# Patient Record
Sex: MALE | Race: WHITE | NOT HISPANIC OR LATINO | Employment: FULL TIME | URBAN - METROPOLITAN AREA
[De-identification: names, ages, dates, MRNs, and addresses within clinical notes are randomized per-mention and may not be internally consistent; named-entity substitution may affect disease eponyms.]

---

## 2020-08-12 ENCOUNTER — APPOINTMENT (EMERGENCY)
Dept: RADIOLOGY | Facility: HOSPITAL | Age: 35
End: 2020-08-12
Payer: COMMERCIAL

## 2020-08-12 ENCOUNTER — HOSPITAL ENCOUNTER (EMERGENCY)
Facility: HOSPITAL | Age: 35
Discharge: HOME | End: 2020-08-12
Attending: EMERGENCY MEDICINE | Admitting: EMERGENCY MEDICINE
Payer: COMMERCIAL

## 2020-08-12 VITALS
WEIGHT: 199.52 LBS | TEMPERATURE: 98 F | SYSTOLIC BLOOD PRESSURE: 141 MMHG | HEART RATE: 68 BPM | RESPIRATION RATE: 18 BRPM | OXYGEN SATURATION: 98 % | DIASTOLIC BLOOD PRESSURE: 84 MMHG

## 2020-08-12 DIAGNOSIS — M25.512 LEFT SHOULDER PAIN: ICD-10-CM

## 2020-08-12 DIAGNOSIS — S49.92XA INJURY OF LEFT SHOULDER: Primary | ICD-10-CM

## 2020-08-12 PROCEDURE — 73030 X-RAY EXAM OF SHOULDER: CPT

## 2020-08-12 PROCEDURE — 99283 EMERGENCY DEPT VISIT LOW MDM: CPT

## 2020-08-12 PROCEDURE — 73010 X-RAY EXAM OF SHOULDER BLADE: CPT

## 2020-08-12 PROCEDURE — 99284 EMERGENCY DEPT VISIT MOD MDM: CPT | Performed by: PHYSICIAN ASSISTANT

## 2020-08-12 RX ORDER — LIDOCAINE HYDROCHLORIDE 20 MG/ML
JELLY TOPICAL 3 TIMES DAILY
Qty: 30 ML | Refills: 0 | Status: SHIPPED | OUTPATIENT
Start: 2020-08-12 | End: 2022-01-10 | Stop reason: ALTCHOICE
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
## 2020-08-18 ENCOUNTER — OFFICE VISIT (OUTPATIENT)
Dept: OBGYN CLINIC | Facility: CLINIC | Age: 35
End: 2020-08-18
Payer: COMMERCIAL

## 2020-08-18 VITALS
SYSTOLIC BLOOD PRESSURE: 112 MMHG | HEART RATE: 65 BPM | WEIGHT: 202 LBS | BODY MASS INDEX: 31.71 KG/M2 | TEMPERATURE: 98 F | DIASTOLIC BLOOD PRESSURE: 69 MMHG | HEIGHT: 67 IN

## 2020-08-18 DIAGNOSIS — Z13.89 ENCOUNTER FOR IMAGING TO SCREEN FOR METAL PRIOR TO MRI: ICD-10-CM

## 2020-08-18 DIAGNOSIS — S49.92XA INJURY OF GLENOID LABRUM, LEFT, INITIAL ENCOUNTER: Primary | ICD-10-CM

## 2020-08-18 PROCEDURE — 99203 OFFICE O/P NEW LOW 30 MIN: CPT | Performed by: FAMILY MEDICINE
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
## 2020-08-21 ENCOUNTER — HOSPITAL ENCOUNTER (OUTPATIENT)
Dept: RADIOLOGY | Facility: HOSPITAL | Age: 35
Discharge: HOME | End: 2020-08-21
Attending: FAMILY MEDICINE
Payer: COMMERCIAL

## 2020-08-21 ENCOUNTER — HOSPITAL ENCOUNTER (OUTPATIENT)
Dept: RADIOLOGY | Facility: HOSPITAL | Age: 35
Discharge: HOME | End: 2020-08-21
Attending: FAMILY MEDICINE | Admitting: RADIOLOGY
Payer: COMMERCIAL

## 2020-08-21 ENCOUNTER — TRANSCRIBE ORDERS (OUTPATIENT)
Dept: RADIOLOGY | Facility: HOSPITAL | Age: 35
End: 2020-08-21

## 2020-08-21 DIAGNOSIS — S49.92XA INJURY OF GLENOID LABRUM, LEFT, INITIAL ENCOUNTER: ICD-10-CM

## 2020-08-21 PROCEDURE — G1004 CDSM NDSC: HCPCS

## 2020-08-21 PROCEDURE — 73222 MRI JOINT UPR EXTREM W/DYE: CPT

## 2020-08-21 PROCEDURE — 77002 NEEDLE LOCALIZATION BY XRAY: CPT

## 2020-08-21 PROCEDURE — A9585 GADOBUTROL INJECTION: HCPCS | Performed by: FAMILY MEDICINE

## 2020-08-21 PROCEDURE — 23350 INJECTION FOR SHOULDER X-RAY: CPT

## 2020-08-21 RX ORDER — SODIUM CHLORIDE 9 MG/ML
50 INJECTION INTRAVENOUS
Status: COMPLETED | OUTPATIENT
Start: 2020-08-21 | End: 2020-08-21

## 2020-08-21 RX ORDER — LIDOCAINE HYDROCHLORIDE 10 MG/ML
5 INJECTION, SOLUTION EPIDURAL; INFILTRATION; INTRACAUDAL; PERINEURAL
Status: COMPLETED | OUTPATIENT
Start: 2020-08-21 | End: 2020-08-21

## 2020-08-21 RX ADMIN — LIDOCAINE HYDROCHLORIDE 5 ML: 10 INJECTION, SOLUTION EPIDURAL; INFILTRATION; INTRACAUDAL; PERINEURAL at 13:10

## 2020-08-21 RX ADMIN — IOHEXOL 3 ML: 300 INJECTION, SOLUTION INTRAVENOUS at 13:10

## 2020-08-21 RX ADMIN — SODIUM CHLORIDE 12 ML: 9 INJECTION, SOLUTION INTRAMUSCULAR; INTRAVENOUS; SUBCUTANEOUS at 13:10

## 2020-08-21 RX ADMIN — GADOBUTROL 1 ML: 604.72 INJECTION INTRAVENOUS at 13:10

## 2020-08-26 ENCOUNTER — OFFICE VISIT (OUTPATIENT)
Dept: OBGYN CLINIC | Facility: CLINIC | Age: 35
End: 2020-08-26
Payer: COMMERCIAL

## 2020-08-26 VITALS
TEMPERATURE: 98.9 F | HEIGHT: 67 IN | SYSTOLIC BLOOD PRESSURE: 120 MMHG | BODY MASS INDEX: 31.71 KG/M2 | DIASTOLIC BLOOD PRESSURE: 77 MMHG | HEART RATE: 76 BPM | WEIGHT: 202 LBS

## 2020-08-26 DIAGNOSIS — S46.012A ROTATOR CUFF STRAIN, LEFT, INITIAL ENCOUNTER: ICD-10-CM

## 2020-08-26 DIAGNOSIS — M19.012 ARTHRITIS OF LEFT ACROMIOCLAVICULAR JOINT: ICD-10-CM

## 2020-08-26 DIAGNOSIS — M62.838 TRAPEZIUS MUSCLE SPASM: ICD-10-CM

## 2020-08-26 DIAGNOSIS — M75.42 SUBACROMIAL IMPINGEMENT OF LEFT SHOULDER: Primary | ICD-10-CM

## 2020-08-26 PROCEDURE — 99213 OFFICE O/P EST LOW 20 MIN: CPT | Performed by: FAMILY MEDICINE

## 2020-08-26 RX ORDER — DICLOFENAC SODIUM 75 MG/1
75 TABLET, DELAYED RELEASE ORAL 2 TIMES DAILY
Qty: 60 TABLET | Refills: 1 | Status: SHIPPED | OUTPATIENT
Start: 2020-08-26 | End: 2022-01-10 | Stop reason: ALTCHOICE
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
## 2020-10-12 ENCOUNTER — OFFICE VISIT (OUTPATIENT)
Dept: OBGYN CLINIC | Facility: CLINIC | Age: 35
End: 2020-10-12
Payer: COMMERCIAL

## 2020-10-12 VITALS
SYSTOLIC BLOOD PRESSURE: 111 MMHG | WEIGHT: 202 LBS | BODY MASS INDEX: 31.71 KG/M2 | TEMPERATURE: 98.5 F | HEART RATE: 67 BPM | DIASTOLIC BLOOD PRESSURE: 72 MMHG | HEIGHT: 67 IN

## 2020-10-12 DIAGNOSIS — M75.22 BICEPS TENDINITIS OF LEFT SHOULDER: Primary | ICD-10-CM

## 2020-10-12 PROCEDURE — 20610 DRAIN/INJ JOINT/BURSA W/O US: CPT | Performed by: FAMILY MEDICINE

## 2020-10-12 PROCEDURE — 99213 OFFICE O/P EST LOW 20 MIN: CPT | Performed by: FAMILY MEDICINE

## 2020-10-12 RX ORDER — TRIAMCINOLONE ACETONIDE 40 MG/ML
20 INJECTION, SUSPENSION INTRA-ARTICULAR; INTRAMUSCULAR
Status: COMPLETED | OUTPATIENT
Start: 2020-10-12 | End: 2020-10-12

## 2020-10-12 RX ORDER — LIDOCAINE HYDROCHLORIDE 10 MG/ML
3 INJECTION, SOLUTION INFILTRATION; PERINEURAL
Status: COMPLETED | OUTPATIENT
Start: 2020-10-12 | End: 2020-10-12

## 2020-10-12 RX ADMIN — TRIAMCINOLONE ACETONIDE 20 MG: 40 INJECTION, SUSPENSION INTRA-ARTICULAR; INTRAMUSCULAR at 16:35

## 2020-10-12 RX ADMIN — LIDOCAINE HYDROCHLORIDE 3 ML: 10 INJECTION, SOLUTION INFILTRATION; PERINEURAL at 16:35

## 2020-11-13 ENCOUNTER — NURSE TRIAGE (OUTPATIENT)
Dept: OTHER | Facility: OTHER | Age: 35
End: 2020-11-13

## 2020-11-13 DIAGNOSIS — Z11.59 SPECIAL SCREENING EXAMINATION FOR UNSPECIFIED VIRAL DISEASE: Primary | ICD-10-CM

## 2020-11-13 DIAGNOSIS — Z11.59 SPECIAL SCREENING EXAMINATION FOR UNSPECIFIED VIRAL DISEASE: ICD-10-CM

## 2020-11-13 PROCEDURE — U0003 INFECTIOUS AGENT DETECTION BY NUCLEIC ACID (DNA OR RNA); SEVERE ACUTE RESPIRATORY SYNDROME CORONAVIRUS 2 (SARS-COV-2) (CORONAVIRUS DISEASE [COVID-19]), AMPLIFIED PROBE TECHNIQUE, MAKING USE OF HIGH THROUGHPUT TECHNOLOGIES AS DESCRIBED BY CMS-2020-01-R: HCPCS | Performed by: FAMILY MEDICINE

## 2020-11-15 LAB — SARS-COV-2 RNA SPEC QL NAA+PROBE: NOT DETECTED

## 2020-11-23 ENCOUNTER — OFFICE VISIT (OUTPATIENT)
Dept: OBGYN CLINIC | Facility: CLINIC | Age: 35
End: 2020-11-23
Payer: COMMERCIAL

## 2020-11-23 VITALS
BODY MASS INDEX: 31.71 KG/M2 | SYSTOLIC BLOOD PRESSURE: 124 MMHG | HEIGHT: 67 IN | WEIGHT: 202 LBS | HEART RATE: 66 BPM | DIASTOLIC BLOOD PRESSURE: 82 MMHG

## 2020-11-23 DIAGNOSIS — M75.22 BICEPS TENDINITIS OF LEFT SHOULDER: Primary | ICD-10-CM

## 2020-11-23 DIAGNOSIS — M19.012 ARTHRITIS OF LEFT ACROMIOCLAVICULAR JOINT: ICD-10-CM

## 2020-11-23 PROCEDURE — 99213 OFFICE O/P EST LOW 20 MIN: CPT | Performed by: FAMILY MEDICINE

## 2021-03-30 DIAGNOSIS — Z23 ENCOUNTER FOR IMMUNIZATION: ICD-10-CM

## 2021-11-22 ENCOUNTER — TELEPHONE (OUTPATIENT)
Dept: FAMILY MEDICINE CLINIC | Facility: CLINIC | Age: 36
End: 2021-11-22

## 2022-01-10 ENCOUNTER — OFFICE VISIT (OUTPATIENT)
Dept: FAMILY MEDICINE CLINIC | Facility: CLINIC | Age: 37
End: 2022-01-10
Payer: COMMERCIAL

## 2022-01-10 VITALS
DIASTOLIC BLOOD PRESSURE: 78 MMHG | BODY MASS INDEX: 28.19 KG/M2 | HEIGHT: 68 IN | OXYGEN SATURATION: 97 % | HEART RATE: 89 BPM | TEMPERATURE: 98.3 F | SYSTOLIC BLOOD PRESSURE: 112 MMHG | WEIGHT: 186 LBS

## 2022-01-10 DIAGNOSIS — Z13.220 SCREENING FOR LIPID DISORDERS: ICD-10-CM

## 2022-01-10 DIAGNOSIS — A60.00 RECURRENT GENITAL HERPES: ICD-10-CM

## 2022-01-10 DIAGNOSIS — F41.9 ANXIETY: Primary | ICD-10-CM

## 2022-01-10 DIAGNOSIS — Z13.1 SCREENING FOR DIABETES MELLITUS: ICD-10-CM

## 2022-01-10 PROCEDURE — 99204 OFFICE O/P NEW MOD 45 MIN: CPT | Performed by: FAMILY MEDICINE

## 2022-01-10 PROCEDURE — 3008F BODY MASS INDEX DOCD: CPT | Performed by: FAMILY MEDICINE

## 2022-01-10 PROCEDURE — 3725F SCREEN DEPRESSION PERFORMED: CPT | Performed by: FAMILY MEDICINE

## 2022-01-10 RX ORDER — VALACYCLOVIR HYDROCHLORIDE 500 MG/1
500 TABLET, FILM COATED ORAL 2 TIMES DAILY
Qty: 6 TABLET | Refills: 3 | Status: SHIPPED | OUTPATIENT
Start: 2022-01-10 | End: 2022-01-13

## 2022-01-10 RX ORDER — ALPRAZOLAM 0.25 MG/1
0.25 TABLET ORAL DAILY PRN
Qty: 30 TABLET | Refills: 0 | Status: SHIPPED | OUTPATIENT
Start: 2022-01-10

## 2022-01-10 NOTE — PROGRESS NOTES
BMI Counseling: Body mass index is 28 28 kg/m²  The BMI is above normal  Nutrition recommendations include reducing portion sizes and 3-5 servings of fruits/vegetables daily  Exercise recommendations include exercising 3-5 times per week

## 2022-01-10 NOTE — PROGRESS NOTES
Assessment/Plan:    No problem-specific Assessment & Plan notes found for this encounter  Diagnoses and all orders for this visit:    Anxiety  After discussing risks and benefits of medication along with side effects will start the following:  -     ALPRAZolam (XANAX) 0 25 mg tablet; Take 1 tablet (0 25 mg total) by mouth daily as needed for anxiety  PDMP checked and appropriate  Controlled substance agreement signed today    Recurrent genital herpes  -     valACYclovir (VALTREX) 500 mg tablet; Take 1 tablet (500 mg total) by mouth 2 (two) times a day for 3 days    Screening for diabetes mellitus  -     Comprehensive metabolic panel; Future    Screening for lipid disorders  -     Lipid panel; Future      Follow up in 6 months or as needed    Subjective:      Patient ID: Jose Rodriguez is a 39 y o  male  Patient is here to establish care  Has a Hx of anxiety and panic attacks takes xanax only seldom as needed  Also has a Hx of genitals recurrent herpes  Takes Valtrex when lesions occur  The following portions of the patient's history were reviewed and updated as appropriate:   He  has no past medical history on file  He   Patient Active Problem List    Diagnosis Date Noted    Anxiety 01/10/2022    Recurrent genital herpes 01/10/2022     He  has a past surgical history that includes Hernia repair; ARTHROSCOPY KNEE; and FL injection left shoulder (arthrogram) (8/21/2020)  His family history is not on file  He  reports that he has never smoked  He has never used smokeless tobacco  He reports previous alcohol use  He reports previous drug use    Current Outpatient Medications   Medication Sig Dispense Refill    ALPRAZolam (XANAX) 0 25 mg tablet Take 1 tablet (0 25 mg total) by mouth daily as needed for anxiety 30 tablet 0    valACYclovir (VALTREX) 500 mg tablet Take 1 tablet (500 mg total) by mouth 2 (two) times a day for 3 days 6 tablet 3     No current facility-administered medications for this visit  Current Outpatient Medications on File Prior to Visit   Medication Sig    [DISCONTINUED] diclofenac (VOLTAREN) 75 mg EC tablet Take 1 tablet (75 mg total) by mouth 2 (two) times a day (Patient not taking: Reported on 11/23/2020)    [DISCONTINUED] diclofenac sodium (VOLTAREN) 50 mg EC tablet Take 1 tablet (50 mg total) by mouth 2 (two) times a day (Patient not taking: Reported on 11/23/2020)    [DISCONTINUED] lidocaine (XYLOCAINE) 2 % topical gel Apply topically 3 (three) times a day P r n  Pain to the affected area (Patient not taking: Reported on 11/23/2020)     No current facility-administered medications on file prior to visit  He has No Known Allergies       Review of Systems   Constitutional: Negative for activity change, appetite change, fatigue and fever  HENT: Negative for congestion and ear discharge  Respiratory: Negative for cough and shortness of breath  Cardiovascular: Negative for chest pain and palpitations  Gastrointestinal: Negative for diarrhea and nausea  Musculoskeletal: Negative for arthralgias and back pain  Skin: Negative for color change and rash  Neurological: Negative for dizziness and headaches  Psychiatric/Behavioral: Negative for agitation and behavioral problems  Objective:      /78 (BP Location: Left arm, Patient Position: Sitting, Cuff Size: Standard)   Pulse 89   Temp 98 3 °F (36 8 °C)   Ht 5' 8" (1 727 m)   Wt 84 4 kg (186 lb)   SpO2 97%   BMI 28 28 kg/m²          Physical Exam  Constitutional:       General: He is not in acute distress  Appearance: He is well-developed  He is not diaphoretic  Eyes:      General: No scleral icterus  Pupils: Pupils are equal, round, and reactive to light  Cardiovascular:      Rate and Rhythm: Normal rate and regular rhythm  Heart sounds: Normal heart sounds  No murmur heard  Pulmonary:      Effort: Pulmonary effort is normal  No respiratory distress        Breath sounds: Normal breath sounds  No wheezing  Abdominal:      General: Bowel sounds are normal  There is no distension  Palpations: Abdomen is soft  Tenderness: There is no abdominal tenderness  Skin:     General: Skin is warm and dry  Findings: No rash  Neurological:      Mental Status: He is alert and oriented to person, place, and time

## 2022-03-23 ENCOUNTER — OFFICE VISIT (OUTPATIENT)
Dept: URGENT CARE | Facility: CLINIC | Age: 37
End: 2022-03-23
Payer: COMMERCIAL

## 2022-03-23 VITALS
BODY MASS INDEX: 26.52 KG/M2 | DIASTOLIC BLOOD PRESSURE: 69 MMHG | HEIGHT: 68 IN | TEMPERATURE: 98.2 F | OXYGEN SATURATION: 97 % | WEIGHT: 175 LBS | SYSTOLIC BLOOD PRESSURE: 125 MMHG | HEART RATE: 74 BPM | RESPIRATION RATE: 18 BRPM

## 2022-03-23 DIAGNOSIS — L21.9 SEBORRHEIC DERMATITIS: Primary | ICD-10-CM

## 2022-03-23 PROCEDURE — 99213 OFFICE O/P EST LOW 20 MIN: CPT | Performed by: PHYSICIAN ASSISTANT

## 2022-03-23 NOTE — PROGRESS NOTES
[facility]        NAME: Renata Saavedra is a 39 y o  male  : 1985    MRN: [id number]  DATE: 2022  TIME: 12:41 PM    Assessment and Plan   Seborrheic dermatitis [L21 9]  1  Seborrheic dermatitis  selenium sulfide (SELSUN) 1 %         Patient Instructions     Patient Instructions   Start selenium shampoo  Use shampoo until symptoms resolve  Refer to derm  Do not scratch  Follow up with Primary Care Physician  Return to clinic with new or worsening symptoms as discussed  Seborrheic Dermatitis   AMBULATORY CARE:   Seborrheic dermatitis  is a skin condition that causes a rash and flaking, scaling skin  The condition usually affects hairy areas of the body, such as the scalp  Your face, ears, chest, groin, or back may be affected  Seborrheic dermatitis can happen at any age  The condition often goes away on its own in infants, but it may return during adolescence  Seborrheic dermatitis may be caused by a fungal infection, immune system problems, or hormone changes  Common signs and symptoms: You may have symptoms during the winter but not during the summer  Stress or a lack of sleep can make your symptoms worse  You may have any of the following:  · Skin flakes, or red, itching, or stinging skin    · Scaly patches (scales) of skin that are also greasy    · White or yellow crust on the skin or eyelids    · Scaling on the scalp commonly known as dandruff    Call your doctor or dermatologist if:   · You have new or worsening symptoms  · Your symptoms make it difficult for you to do your daily activities  · Your symptoms do not improve even after treatment  · You have questions or concerns about your condition or care  Treatment  may not be needed  The following are commonly used when seborrheic dermatitis needs to be treated:  · Medicines  may be given to treat a fungal or bacterial infection  You may also need a steroid medicine   You may be given these medicines in pill form or in a cream to apply to your skin  · Dandruff shampoo  may help control symptoms  The shampoo may be used on your scalp and hair, and also on your skin  Your healthcare provider may recommend that you start with a mild dandruff shampoo  You may need to use a stronger shampoo or alternate shampoos if your symptoms do not improve  Ask which kind is right for your hair  Some shampoos used to manage seborrheic dermatitis contain coal tar or other ingredients that may discolor light hair  · Light therapy  may be used if other treatments do not work  You will receive a medicine to make your skin more sensitive to light  Then your skin will be put under an ultraviolet light  The light helps control skin growth  Manage seborrheic dermatitis:   · Wash your skin and hair often  Your healthcare provider can tell you how often to wash  You may need to wash your hair every day or two, or once per week, depending on the kind of hair you have  Apply a gentle moisturizer to your skin after you wash  Use mild soaps and moisturizers  Do not use any product that contains alcohol  Alcohol can dry your skin and make your symptoms worse  · Protect your scalp if you use coal tar shampoo  Coal tar shampoo can make your skin more sensitive to light  Wear a hat when you are outside  Do not use tanning beds or sun lamps  · Remove scales after you soften them  Do not pull on the scales  This can spread infection and may cause hair loss  Apply mineral oil or olive oil to the skin and let it sit for 1 hour  Then use a soft-bristled brush to remove the scales or shampoo your hair  · Clean your eyelids, if needed  Use baby shampoo to wash your eyelids every night  Use a cotton swab to remove scales  A warm compress may also help control symptoms  To make a warm compress, soak a soft washcloth in warm water  Wring out the extra water and apply the cloth to your eyelid for a few minutes      · Consider shaving off your beard or mustache  Your symptoms may be worse under your beard or mustache  Shaving may help reduce your symptoms and prevent them from returning in this area  Follow up with your doctor or dermatologist as directed:  Write down your questions so you remember to ask them during your visits  © Copyright [employer] 2022 Information is for End User's use only and may not be sold, redistributed or otherwise used for commercial purposes  All illustrations and images included in CareNotes® are the copyrighted property of A D A M [employer] Inc  or [employer]   The above information is an  only  It is not intended as medical advice for individual conditions or treatments  Talk to your doctor, nurse or pharmacist before following any medical regimen to see if it is safe and effective for you  **Portions of the record may have been created with voice recognition software  Occasional wrong word or "sound a like" substitutions may have occurred due to the inherent limitations of voice recognition software  Read the chart carefully and recognize, using context, where substitutions have occurred  **     Chief Complaint     Chief Complaint   Patient presents with    Rash     Pt c/o rash on scalp x 8-9 months  History of Present Illness     46yo male presents to clinic with complaints of scalp rash x 9 months  States the rash occurred shortly after finishing a course of antibiotics for acne  reprots red bumps and itching that will flare up in different spots on his scalp  Denies pain, fever  He has been using defferen gel and tea tree oil which seems to help  Review of Systems     Review of Systems   Constitutional: Negative for chills and fever  HENT: Negative for congestion and trouble swallowing  Respiratory: Negative for cough and shortness of breath  Musculoskeletal: Negative for arthralgias and myalgias  Skin: Positive for rash  Neurological: Negative for headaches  Current Medications     Current Outpatient Medications:     ALPRAZolam (XANAX) 0 25 mg tablet, Take 1 tablet (0 25 mg total) by mouth daily as needed for anxiety, Disp: 30 tablet, Rfl: 0    [START ON 3/24/2022] selenium sulfide (SELSUN) 1 %, Apply topically 2 (two) times a week, Disp: 118 mL, Rfl: 0    valACYclovir (VALTREX) 500 mg tablet, Take 1 tablet (500 mg total) by mouth 2 (two) times a day for 3 days, Disp: 6 tablet, Rfl: 3    Current Allergies     Allergies as of 03/23/2022    (No Known Allergies)            The following portions of the patient's history were reviewed and updated as appropriate: allergies, current medications, past family history, past medical history, past social history, past surgical history and problem list      Past Medical History:   Diagnosis Date    Tachycardia        Past Surgical History:   Procedure Laterality Date    ARTHROSCOPY KNEE      FL INJECTION LEFT SHOULDER (ARTHROGRAM)  8/21/2020    HERNIA REPAIR         No family history on file  Medications have been verified  Objective     /69   Pulse 74   Temp 98 2 °F (36 8 °C)   Resp 18   Ht 5' 8" (1 727 m)   Wt 79 4 kg (175 lb)   SpO2 97%   BMI 26 61 kg/m²        Physical Exam     Physical Exam  Vitals and nursing note reviewed  Constitutional:       General: He is not in acute distress  Appearance: Normal appearance  HENT:      Head: Normocephalic and atraumatic  Cardiovascular:      Rate and Rhythm: Normal rate and regular rhythm  Pulses: Normal pulses  Pulmonary:      Effort: Pulmonary effort is normal       Breath sounds: Normal breath sounds  Skin:     Capillary Refill: Capillary refill takes less than 2 seconds  Findings: Rash (grouped, erythematous macuolopapular rash in various spots of scalp  no edema, drainage, heat or ttp  scaling present  ) present  Neurological:      Mental Status: He is alert  Sensory: No sensory deficit

## 2022-03-23 NOTE — PATIENT INSTRUCTIONS
Start selenium shampoo  Use shampoo until symptoms resolve  Refer to derm  Do not scratch  Follow up with Primary Care Physician  Return to clinic with new or worsening symptoms as discussed  Seborrheic Dermatitis   AMBULATORY CARE:   Seborrheic dermatitis  is a skin condition that causes a rash and flaking, scaling skin  The condition usually affects hairy areas of the body, such as the scalp  Your face, ears, chest, groin, or back may be affected  Seborrheic dermatitis can happen at any age  The condition often goes away on its own in infants, but it may return during adolescence  Seborrheic dermatitis may be caused by a fungal infection, immune system problems, or hormone changes  Common signs and symptoms: You may have symptoms during the winter but not during the summer  Stress or a lack of sleep can make your symptoms worse  You may have any of the following:  · Skin flakes, or red, itching, or stinging skin    · Scaly patches (scales) of skin that are also greasy    · White or yellow crust on the skin or eyelids    · Scaling on the scalp commonly known as dandruff    Call your doctor or dermatologist if:   · You have new or worsening symptoms  · Your symptoms make it difficult for you to do your daily activities  · Your symptoms do not improve even after treatment  · You have questions or concerns about your condition or care  Treatment  may not be needed  The following are commonly used when seborrheic dermatitis needs to be treated:  · Medicines  may be given to treat a fungal or bacterial infection  You may also need a steroid medicine  You may be given these medicines in pill form or in a cream to apply to your skin  · Dandruff shampoo  may help control symptoms  The shampoo may be used on your scalp and hair, and also on your skin  Your healthcare provider may recommend that you start with a mild dandruff shampoo   You may need to use a stronger shampoo or alternate shampoos if your symptoms do not improve  Ask which kind is right for your hair  Some shampoos used to manage seborrheic dermatitis contain coal tar or other ingredients that may discolor light hair  · Light therapy  may be used if other treatments do not work  You will receive a medicine to make your skin more sensitive to light  Then your skin will be put under an ultraviolet light  The light helps control skin growth  Manage seborrheic dermatitis:   · Wash your skin and hair often  Your healthcare provider can tell you how often to wash  You may need to wash your hair every day or two, or once per week, depending on the kind of hair you have  Apply a gentle moisturizer to your skin after you wash  Use mild soaps and moisturizers  Do not use any product that contains alcohol  Alcohol can dry your skin and make your symptoms worse  · Protect your scalp if you use coal tar shampoo  Coal tar shampoo can make your skin more sensitive to light  Wear a hat when you are outside  Do not use tanning beds or sun lamps  · Remove scales after you soften them  Do not pull on the scales  This can spread infection and may cause hair loss  Apply mineral oil or olive oil to the skin and let it sit for 1 hour  Then use a soft-bristled brush to remove the scales or shampoo your hair  · Clean your eyelids, if needed  Use baby shampoo to wash your eyelids every night  Use a cotton swab to remove scales  A warm compress may also help control symptoms  To make a warm compress, soak a soft washcloth in warm water  Wring out the extra water and apply the cloth to your eyelid for a few minutes  · Consider shaving off your beard or mustache  Your symptoms may be worse under your beard or mustache  Shaving may help reduce your symptoms and prevent them from returning in this area  Follow up with your doctor or dermatologist as directed:  Write down your questions so you remember to ask them during your visits    © Copyright [employer] 2022 Information is for [employer] use only and may not be sold, redistributed or otherwise used for commercial purposes  All illustrations and images included in CareNotes® are the copyrighted property of A [employer] A M , Inc  or [employer]  The above information is an  only  It is not intended as medical advice for individual conditions or treatments  Talk to your doctor, nurse or pharmacist before following any medical regimen to see if it is safe and effective for you

## 2023-02-13 ENCOUNTER — TELEPHONE (OUTPATIENT)
Dept: FAMILY MEDICINE CLINIC | Facility: CLINIC | Age: 38
End: 2023-02-13

## 2023-02-13 DIAGNOSIS — Z13.220 SCREENING, LIPID: ICD-10-CM

## 2023-02-13 DIAGNOSIS — Z13.1 SCREENING FOR DIABETES MELLITUS (DM): Primary | ICD-10-CM

## 2023-02-13 NOTE — TELEPHONE ENCOUNTER
Pt stopped in to have labs re-instated    Pt works out of town and requests updated lab scripts for : lipid panel and cmp

## 2023-12-19 ENCOUNTER — APPOINTMENT (OUTPATIENT)
Dept: LAB | Facility: CLINIC | Age: 38
End: 2023-12-19
Payer: COMMERCIAL

## 2023-12-19 DIAGNOSIS — Z13.1 SCREENING FOR DIABETES MELLITUS (DM): ICD-10-CM

## 2023-12-19 DIAGNOSIS — Z13.220 SCREENING, LIPID: ICD-10-CM

## 2023-12-19 LAB
ALBUMIN SERPL BCP-MCNC: 4.6 G/DL (ref 3.5–5)
ALP SERPL-CCNC: 47 U/L (ref 34–104)
ALT SERPL W P-5'-P-CCNC: 10 U/L (ref 7–52)
ANION GAP SERPL CALCULATED.3IONS-SCNC: 6 MMOL/L
AST SERPL W P-5'-P-CCNC: 17 U/L (ref 13–39)
BILIRUB SERPL-MCNC: 0.92 MG/DL (ref 0.2–1)
BUN SERPL-MCNC: 11 MG/DL (ref 5–25)
CALCIUM SERPL-MCNC: 9.2 MG/DL (ref 8.4–10.2)
CHLORIDE SERPL-SCNC: 105 MMOL/L (ref 96–108)
CHOLEST SERPL-MCNC: 134 MG/DL
CO2 SERPL-SCNC: 29 MMOL/L (ref 21–32)
CREAT SERPL-MCNC: 0.92 MG/DL (ref 0.6–1.3)
GFR SERPL CREATININE-BSD FRML MDRD: 105 ML/MIN/1.73SQ M
GLUCOSE P FAST SERPL-MCNC: 90 MG/DL (ref 65–99)
HDLC SERPL-MCNC: 45 MG/DL
LDLC SERPL CALC-MCNC: 67 MG/DL (ref 0–100)
NONHDLC SERPL-MCNC: 89 MG/DL
POTASSIUM SERPL-SCNC: 4.6 MMOL/L (ref 3.5–5.3)
PROT SERPL-MCNC: 6.7 G/DL (ref 6.4–8.4)
SODIUM SERPL-SCNC: 140 MMOL/L (ref 135–147)
TRIGL SERPL-MCNC: 112 MG/DL

## 2023-12-19 PROCEDURE — 80053 COMPREHEN METABOLIC PANEL: CPT

## 2023-12-19 PROCEDURE — 80061 LIPID PANEL: CPT

## 2023-12-19 PROCEDURE — 36415 COLL VENOUS BLD VENIPUNCTURE: CPT

## 2024-10-11 ENCOUNTER — OFFICE VISIT (OUTPATIENT)
Dept: OBGYN CLINIC | Facility: CLINIC | Age: 39
End: 2024-10-11
Payer: COMMERCIAL

## 2024-10-11 VITALS
BODY MASS INDEX: 26.01 KG/M2 | DIASTOLIC BLOOD PRESSURE: 77 MMHG | HEIGHT: 68 IN | WEIGHT: 171.6 LBS | SYSTOLIC BLOOD PRESSURE: 137 MMHG | HEART RATE: 67 BPM

## 2024-10-11 DIAGNOSIS — M77.12 LATERAL EPICONDYLITIS OF LEFT ELBOW: Primary | ICD-10-CM

## 2024-10-11 PROCEDURE — 20551 NJX 1 TENDON ORIGIN/INSJ: CPT | Performed by: FAMILY MEDICINE

## 2024-10-11 PROCEDURE — 99203 OFFICE O/P NEW LOW 30 MIN: CPT | Performed by: FAMILY MEDICINE

## 2024-10-11 RX ORDER — TRIAMCINOLONE ACETONIDE 40 MG/ML
40 INJECTION, SUSPENSION INTRA-ARTICULAR; INTRAMUSCULAR
Status: COMPLETED | OUTPATIENT
Start: 2024-10-11 | End: 2024-10-11

## 2024-10-11 RX ORDER — BUPIVACAINE HYDROCHLORIDE 2.5 MG/ML
1 INJECTION, SOLUTION INFILTRATION; PERINEURAL
Status: COMPLETED | OUTPATIENT
Start: 2024-10-11 | End: 2024-10-11

## 2024-10-11 RX ADMIN — TRIAMCINOLONE ACETONIDE 40 MG: 40 INJECTION, SUSPENSION INTRA-ARTICULAR; INTRAMUSCULAR at 15:00

## 2024-10-11 RX ADMIN — BUPIVACAINE HYDROCHLORIDE 1 ML: 2.5 INJECTION, SOLUTION INFILTRATION; PERINEURAL at 15:00

## 2024-10-11 NOTE — PATIENT INSTRUCTIONS
Tennis elbow strap    Over the counter diclofenac gel/voltaren  - 3 times daily      Occupational therapy and home exercises

## 2024-10-11 NOTE — PROGRESS NOTES
Assessment/Plan:  Assessment & Plan   Diagnoses and all orders for this visit:    Lateral epicondylitis of left elbow  -     Hand/upper extremity injection: L elbow  -     Ambulatory Referral to Occupational Therapy; Future        38-year-old left-hand-dominant male with with left elbow pain of more than 3 months duration.  Discussed with patient physical exam, impression, and plan.  Clinical exam and history suggest that he has symptoms from lateral epicondylitis.  I discussed treatment regimen of steroid injection, supportive wear, topical anti-inflammatory, and formal therapy.  Invasive management such as surgery not warranted at this time.  I administered mixture 1 cc 0.25% bupivacaine and 1 cc Kenalog to the lateral epicondyle without complication.  He is to wear tennis elbow strap during physical activity.  He is to apply topical diclofenac gel 3 times daily for the next 10 days.  He was advised if no improvement after steroid injection in 3 to 4 weeks to start formal therapy and home exercises.  He will follow-up as needed.        Subjective:   Patient ID: Wei Marion is a 38 y.o. male.  Chief Complaint   Patient presents with    Left Elbow - Pain        38-year-old left-hand-dominant male presents for evaluation left elbow pain of more than 3 months duration.  He denies trauma or inciting event.  Pain described as gradual in onset, localized to the lateral aspect the elbow, radiating  distally to the forearm, achy and burning, worse with tasks such as driving and gripping, associated with weakness in the hand, and improved with resting.  He sometimes takes ibuprofen before activity to help with symptoms.      Elbow Pain  This is a new problem. The current episode started more than 1 month ago. The problem occurs daily. The problem has been gradually worsening. Associated symptoms include arthralgias and weakness. Pertinent negatives include no joint swelling or numbness. Exacerbated by: Physical  "activity. He has tried rest, position changes and NSAIDs for the symptoms. The treatment provided mild relief.           The following portions of the patient's history were reviewed and updated as appropriate: He  has a past medical history of Tachycardia.  He has No Known Allergies..    Review of Systems   Musculoskeletal:  Positive for arthralgias. Negative for joint swelling.   Neurological:  Positive for weakness. Negative for numbness.       Objective:  Vitals:    10/11/24 1511   BP: 137/77   Pulse: 67   Weight: 77.8 kg (171 lb 9.6 oz)   Height: 5' 8\" (1.727 m)      Left Hand Exam     Range of Motion   Wrist   Extension:  normal   Flexion:  normal     Muscle Strength   Wrist extension: 4/5   Wrist flexion: 5/5   :  4/5     Tests   Tinel's sign (median nerve): negative  Finkelstein's test: negative    Other   Sensation: normal  Pulse: present      Left Elbow Exam     Tenderness   The patient is experiencing tenderness in the lateral epicondyle (Common Extensor).     Range of Motion   The patient has normal left elbow ROM.    Muscle Strength   The patient has normal left elbow strength (5/5 flexion and extension).    Tests   Tinel's sign (cubital tunnel): negative    Other   Sensation: normal    Comments:  Lateral elbow pain with resisted wrist extension arm fully extended          Tenderness     Left Wrist/Hand   Tenderness in the common extensor tendon and lateral epicondyle. No tenderness in the distal biceps tendon, distal triceps tendon, medial epicondyle and olecranon process.     Strength/Myotome Testing     Left Wrist/Hand   Wrist extension: 4  Wrist flexion: 5    Tests     Left Wrist/Hand   Negative Finkelstein's, Tinel's sign (medial nerve) and Tinel's sign (radial tunnel).       Physical Exam  Vitals and nursing note reviewed.   Constitutional:       Appearance: Normal appearance. He is well-developed. He is not ill-appearing or diaphoretic.   HENT:      Head: Normocephalic and atraumatic.      " "Right Ear: External ear normal.      Left Ear: External ear normal.   Eyes:      Conjunctiva/sclera: Conjunctivae normal.   Neck:      Trachea: No tracheal deviation.   Cardiovascular:      Rate and Rhythm: Normal rate.   Pulmonary:      Effort: Pulmonary effort is normal. No respiratory distress.   Abdominal:      General: There is no distension.   Musculoskeletal:         General: Tenderness present.      Left elbow: Tenderness present in lateral epicondyle. No medial epicondyle or olecranon process tenderness.   Skin:     General: Skin is warm and dry.      Coloration: Skin is not jaundiced or pale.   Neurological:      Mental Status: He is alert and oriented to person, place, and time.   Psychiatric:         Mood and Affect: Mood normal.         Behavior: Behavior normal.         Thought Content: Thought content normal.         Judgment: Judgment normal.             Hand/upper extremity injection: L elbow  Universal Protocol:  procedure performed by consultantConsent: Verbal consent obtained.  Risks and benefits: risks, benefits and alternatives were discussed  Consent given by: patient  Time out: Immediately prior to procedure a \"time out\" was called to verify the correct patient, procedure, equipment, support staff and site/side marked as required.  Patient understanding: patient states understanding of the procedure being performed  Patient consent: the patient's understanding of the procedure matches consent given  Procedure consent: procedure consent matches procedure scheduled  Relevant documents: relevant documents present and verified  Test results: test results available and properly labeled  Site marked: the operative site was marked  Radiology Images displayed and confirmed. If images not available, report reviewed: imaging studies available  Required items: required blood products, implants, devices, and special equipment available  Patient identity confirmed: verbally with patient  Supporting " Documentation  Indications: pain   Procedure Details  Condition:lateral epicondylitis Site: L elbow   Preparation: Patient was prepped and draped in the usual sterile fashion  Ultrasound guidance: no  Approach: lateral  Medications administered: 1 mL bupivacaine 0.25 %; 40 mg triamcinolone acetonide 40 mg/mL  Patient tolerance: patient tolerated the procedure well with no immediate complications  Dressing:  Sterile dressing applied         More than 30 minutes spent reviewing patient chart, obtaining history from patient, examining patient, discussing and implementing treatment plan.

## 2025-07-11 ENCOUNTER — APPOINTMENT (OUTPATIENT)
Dept: RADIOLOGY | Facility: CLINIC | Age: 40
End: 2025-07-11
Attending: FAMILY MEDICINE
Payer: COMMERCIAL

## 2025-07-11 ENCOUNTER — OFFICE VISIT (OUTPATIENT)
Dept: OBGYN CLINIC | Facility: CLINIC | Age: 40
End: 2025-07-11
Payer: COMMERCIAL

## 2025-07-11 VITALS — HEIGHT: 68 IN | RESPIRATION RATE: 18 BRPM | WEIGHT: 177 LBS | BODY MASS INDEX: 26.83 KG/M2

## 2025-07-11 DIAGNOSIS — M25.522 PAIN IN LEFT ELBOW: ICD-10-CM

## 2025-07-11 DIAGNOSIS — M25.522 PAIN IN LEFT ELBOW: Primary | ICD-10-CM

## 2025-07-11 PROCEDURE — 73080 X-RAY EXAM OF ELBOW: CPT

## 2025-07-11 PROCEDURE — 99214 OFFICE O/P EST MOD 30 MIN: CPT | Performed by: FAMILY MEDICINE

## 2025-07-11 RX ORDER — MELOXICAM 15 MG/1
15 TABLET ORAL DAILY
Qty: 30 TABLET | Refills: 1 | Status: SHIPPED | OUTPATIENT
Start: 2025-07-11

## 2025-07-11 NOTE — PROGRESS NOTES
Name: Wei Marion      : 1985      MRN: [id number]  Encounter Provider: Feliciano Jones DO  Encounter Date: 2025   Encounter department: [facility] [place]  :  Assessment & Plan  Pain in left elbow  39-year-old left-hand-dominant male with left elbow pain more than 1 year duration.  X-rays of the left elbow are unremarkable for osseous abnormality.  Clinical impression is that he has symptoms from pathology of the elbow.  Symptoms persist despite consider management of steroid injection 10/11/2024, more than 3 months of ibuprofen and Tylenol, more than 3 months of icing.  Clinical exam is noted for tenderness over the common extensor and there is weakness with wrist extension.  At this time I will refer him for MRI left elbow to evaluate for tendon tear as invasive management may be warranted.  In the interim we will do trial prescription NSAID.  Take meloxicam 15 mg once daily as needed.  Pending results of MRI may consider referral to orthopedic surgeon.  Return after having MRI done.  Orders:    MRI elbow left wo contrast; Future    meloxicam (Mobic) 15 mg tablet; Take 1 tablet (15 mg total) by mouth daily    XR elbow 3+ vw left; Future        History of Present Illness   Chief Complaint   Patient presents with    Left Elbow - Follow-up, Pain      HPI  Wei Marion is a 39 y.o. left-hand-dominant male who presents for follow-up of left elbow pain more than 1 year duration.  He was last seen by me 9 months ago at which point he was given a steroid injection and referred to formal therapy.  He reports following steroid injection having significant improvement in symptoms, however pain has been bothersome over the past few months.  He reports having pain described localized to the lateral aspect the elbow, constant, aching and burning, radiating distally to the forearm and hand, associated numbness tingling, worse at lifting and gripping,  "associated weakness, and improved with resting.  He has tried managing symptoms with taking ibuprofen and Tylenol and also icing.    History obtained from: patient    Review of Systems   Musculoskeletal:  Positive for arthralgias. Negative for joint swelling.   Neurological:  Positive for weakness and numbness.          Objective   Resp 18   Ht 5' 8\" (1.727 m)   Wt 80.3 kg (177 lb)   BMI 26.91 kg/m²      Physical Exam  Vitals and nursing note reviewed.   Constitutional:       Appearance: Normal appearance. He is well-developed. He is not ill-appearing or diaphoretic.   HENT:      Head: Normocephalic and atraumatic.      Right Ear: External ear normal.      Left Ear: External ear normal.     Eyes:      General:         Right eye: No discharge.         Left eye: No discharge.     Pulmonary:      Effort: Pulmonary effort is normal. No respiratory distress.   Abdominal:      General: There is no distension.     Musculoskeletal:         General: No tenderness.     Skin:     General: Skin is warm and dry.      Coloration: Skin is not jaundiced or pale.     Neurological:      Mental Status: He is alert and oriented to person, place, and time.     Psychiatric:         Mood and Affect: Mood normal.         Behavior: Behavior normal.         Thought Content: Thought content normal.         Judgment: Judgment normal.       Left Hand Exam     Tenderness   The patient is experiencing no tenderness.     Range of Motion   The patient has normal left wrist ROM.    Muscle Strength   Wrist extension: 4/5   Wrist flexion: 5/5   :  4/5       Left Elbow Exam     Tenderness   The patient is experiencing tenderness in the lateral epicondyle (Common extensor).     Range of Motion   The patient has normal left elbow ROM.    Muscle Strength   The patient has normal left elbow strength (5/5 flexion).    Comments:  4/5 elbow extension    Lateral elbow pain with resisted wrist extension           I have personally reviewed pertinent " films in PACS and my interpretation is  .   X-rays of the left elbow are unremarkable for osseous abnormality.    Administrative Statements   I have spent a total time of 31 minutes in caring for this patient on the day of the visit/encounter including Diagnostic results, Prognosis, Risks and benefits of tx options, Instructions for management, Impressions, Documenting in the medical record, Reviewing/placing orders in the medical record (including tests, medications, and/or procedures), and Obtaining or reviewing history  .

## 2025-07-18 ENCOUNTER — HOSPITAL ENCOUNTER (OUTPATIENT)
Dept: MRI IMAGING | Facility: HOSPITAL | Age: 40
End: 2025-07-18
Attending: FAMILY MEDICINE
Payer: COMMERCIAL

## 2025-07-18 DIAGNOSIS — M25.522 PAIN IN LEFT ELBOW: ICD-10-CM

## 2025-07-18 PROCEDURE — 73221 MRI JOINT UPR EXTREM W/O DYE: CPT

## 2025-07-28 ENCOUNTER — OFFICE VISIT (OUTPATIENT)
Dept: OBGYN CLINIC | Facility: CLINIC | Age: 40
End: 2025-07-28
Payer: COMMERCIAL

## 2025-07-28 VITALS — WEIGHT: 174 LBS | BODY MASS INDEX: 26.46 KG/M2

## 2025-07-28 DIAGNOSIS — S56.512A PARTIAL TEAR OF COMMON EXTENSOR TENDON OF LEFT ELBOW: Primary | ICD-10-CM

## 2025-07-28 PROCEDURE — 99213 OFFICE O/P EST LOW 20 MIN: CPT | Performed by: FAMILY MEDICINE

## 2025-07-29 ENCOUNTER — OFFICE VISIT (OUTPATIENT)
Dept: OBGYN CLINIC | Facility: CLINIC | Age: 40
End: 2025-07-29
Payer: COMMERCIAL

## 2025-07-29 ENCOUNTER — TELEPHONE (OUTPATIENT)
Dept: OBGYN CLINIC | Facility: CLINIC | Age: 40
End: 2025-07-29

## 2025-07-29 VITALS — HEIGHT: 68 IN | RESPIRATION RATE: 18 BRPM | WEIGHT: 174 LBS | BODY MASS INDEX: 26.37 KG/M2

## 2025-07-29 DIAGNOSIS — S56.512A PARTIAL TEAR OF COMMON EXTENSOR TENDON OF LEFT ELBOW: Primary | ICD-10-CM

## 2025-07-29 PROCEDURE — 99243 OFF/OP CNSLTJ NEW/EST LOW 30: CPT | Performed by: STUDENT IN AN ORGANIZED HEALTH CARE EDUCATION/TRAINING PROGRAM
